# Patient Record
Sex: MALE | Race: WHITE | Employment: FULL TIME | URBAN - METROPOLITAN AREA
[De-identification: names, ages, dates, MRNs, and addresses within clinical notes are randomized per-mention and may not be internally consistent; named-entity substitution may affect disease eponyms.]

---

## 2020-10-07 ENCOUNTER — OFFICE VISIT (OUTPATIENT)
Dept: FAMILY MEDICINE CLINIC | Facility: CLINIC | Age: 60
End: 2020-10-07
Payer: COMMERCIAL

## 2020-10-07 VITALS
SYSTOLIC BLOOD PRESSURE: 121 MMHG | BODY MASS INDEX: 26.08 KG/M2 | HEART RATE: 106 BPM | WEIGHT: 166.19 LBS | HEIGHT: 67 IN | DIASTOLIC BLOOD PRESSURE: 68 MMHG | TEMPERATURE: 98 F

## 2020-10-07 DIAGNOSIS — K63.5 POLYP OF COLON, UNSPECIFIED PART OF COLON, UNSPECIFIED TYPE: ICD-10-CM

## 2020-10-07 DIAGNOSIS — Z00.00 ANNUAL PHYSICAL EXAM: Primary | ICD-10-CM

## 2020-10-07 PROCEDURE — 90471 IMMUNIZATION ADMIN: CPT | Performed by: FAMILY MEDICINE

## 2020-10-07 PROCEDURE — 3074F SYST BP LT 130 MM HG: CPT | Performed by: FAMILY MEDICINE

## 2020-10-07 PROCEDURE — 3078F DIAST BP <80 MM HG: CPT | Performed by: FAMILY MEDICINE

## 2020-10-07 PROCEDURE — 90750 HZV VACC RECOMBINANT IM: CPT | Performed by: FAMILY MEDICINE

## 2020-10-07 PROCEDURE — 99386 PREV VISIT NEW AGE 40-64: CPT | Performed by: FAMILY MEDICINE

## 2020-10-07 PROCEDURE — 3008F BODY MASS INDEX DOCD: CPT | Performed by: FAMILY MEDICINE

## 2020-10-07 NOTE — PROGRESS NOTES
HPI:    Patient ID: Mitali Schroeder is a [address] old male. Doing well. No complaints       Review of Systems   Constitutional: Negative. Negative for activity change, appetite change, diaphoresis, fatigue, fever and unexpected weight change.    HENT: Neg reflexes. Skin: Skin is warm and dry.               ASSESSMENT/PLAN:   Annual physical exam  (primary encounter diagnosis)  Polyp of colon, unspecified part of colon, unspecified type  Needs to see GI   Orders Placed This Encounter      Comp Metabolic Pan

## 2020-10-09 ENCOUNTER — APPOINTMENT (OUTPATIENT)
Dept: LAB | Age: 60
End: 2020-10-09
Attending: FAMILY MEDICINE
Payer: COMMERCIAL

## 2020-10-09 PROCEDURE — 85025 COMPLETE CBC W/AUTO DIFF WBC: CPT | Performed by: FAMILY MEDICINE

## 2020-10-09 PROCEDURE — 80061 LIPID PANEL: CPT | Performed by: FAMILY MEDICINE

## 2020-10-09 PROCEDURE — 36415 COLL VENOUS BLD VENIPUNCTURE: CPT | Performed by: FAMILY MEDICINE

## 2020-10-09 PROCEDURE — 80053 COMPREHEN METABOLIC PANEL: CPT | Performed by: FAMILY MEDICINE

## 2021-01-25 ENCOUNTER — TELEPHONE (OUTPATIENT)
Dept: GASTROENTEROLOGY | Facility: CLINIC | Age: 61
End: 2021-01-25

## 2021-01-25 ENCOUNTER — OFFICE VISIT (OUTPATIENT)
Dept: GASTROENTEROLOGY | Facility: CLINIC | Age: 61
End: 2021-01-25
Payer: COMMERCIAL

## 2021-01-25 VITALS
HEIGHT: 67 IN | WEIGHT: 169 LBS | BODY MASS INDEX: 26.53 KG/M2 | HEART RATE: 96 BPM | SYSTOLIC BLOOD PRESSURE: 140 MMHG | DIASTOLIC BLOOD PRESSURE: 86 MMHG

## 2021-01-25 DIAGNOSIS — Z12.11 SCREENING FOR COLORECTAL CANCER: ICD-10-CM

## 2021-01-25 DIAGNOSIS — Z12.11 COLON CANCER SCREENING: Primary | ICD-10-CM

## 2021-01-25 DIAGNOSIS — Z12.12 SCREENING FOR COLORECTAL CANCER: ICD-10-CM

## 2021-01-25 DIAGNOSIS — Z86.010 HX OF COLONIC POLYPS: ICD-10-CM

## 2021-01-25 DIAGNOSIS — Z86.010 HISTORY OF COLON POLYPS: Primary | ICD-10-CM

## 2021-01-25 PROCEDURE — S0285 CNSLT BEFORE SCREEN COLONOSC: HCPCS | Performed by: INTERNAL MEDICINE

## 2021-01-25 PROCEDURE — 3079F DIAST BP 80-89 MM HG: CPT | Performed by: INTERNAL MEDICINE

## 2021-01-25 PROCEDURE — 3077F SYST BP >= 140 MM HG: CPT | Performed by: INTERNAL MEDICINE

## 2021-01-25 PROCEDURE — 3008F BODY MASS INDEX DOCD: CPT | Performed by: INTERNAL MEDICINE

## 2021-01-25 NOTE — TELEPHONE ENCOUNTER
Scheduled for: Colonoscopy [address]07  Provider Name: Dr Maritza Santiago  Date: Fri 4/30/2021   Location: [facility]  Sedation: MAC   Time: 7:30 am, pt is aware that [facility] will call day before with arrival time  Prep: split dose Miralax/gatorade  Meds/Allergies Reconciled

## 2021-01-25 NOTE — PATIENT INSTRUCTIONS
Schedule colonoscopy for screening and a history of polyps following a split dose MiraLAX/Gatorade preparation and either IV sedation or monitored anesthesia care per scheduling.

## 2021-01-26 NOTE — PROGRESS NOTES
HPI:    Patient ID: Lindsey Drake is a 62year old male. HPI  The patient is seen today at the request of Dr. Calixto Cha for colorectal cancer screening in the setting of a history of adenomatous colon polyps.   The patient underwent a screening colonoscopy place, and time. He appears well-developed and well-nourished. No distress. HENT:   Mouth/Throat: Oropharynx is clear and moist. No oropharyngeal exudate. Eyes: Conjunctivae are normal. No scleral icterus. Neck: Neck supple. No thyromegaly present. 136 - 145 mmol/L 139   Potassium      3.5 - 5.1 mmol/L 4.3   Chloride      98 - 112 mmol/L 107   Carbon Dioxide, Total      21.0 - 32.0 mmol/L 27.0   ANION GAP      0 - 18 mmol/L 5   BUN      7 - 18 mg/dL 15   CREATININE      0.70 - 1.30 mg/dL 1.03   BUN/

## 2021-04-01 ENCOUNTER — TELEPHONE (OUTPATIENT)
Dept: FAMILY MEDICINE CLINIC | Facility: CLINIC | Age: 61
End: 2021-04-01

## 2021-04-01 NOTE — TELEPHONE ENCOUNTER
Patient is inquiring about how long should he wait in between the COVID vaccine to receive the 2nd shingles shot. Please call and advise.   [phone]

## 2021-04-01 NOTE — TELEPHONE ENCOUNTER
Pt informed CDC recommends to wait a minimum of 14 days between the Covid-19 vaccine and Shingrix vaccine. Pt states he knows he is due for his 2nd Shingrix vaccine and will need to schedule that appointment.  Pt is not already scheduled for a Covid-19 vacc

## 2021-04-06 ENCOUNTER — PATIENT MESSAGE (OUTPATIENT)
Dept: ADMINISTRATIVE | Age: 61
End: 2021-04-06

## 2021-04-13 ENCOUNTER — TELEPHONE (OUTPATIENT)
Dept: GASTROENTEROLOGY | Facility: CLINIC | Age: 61
End: 2021-04-13

## 2021-04-13 NOTE — TELEPHONE ENCOUNTER
Pt. states that he is sched to have 2nd Corona Vaccine on same date as the [address] on 4/30/2021, so he wants to know if it is still ok to have proc done? . Pt. Also states that he is not sure of arrival time for the [facility].

## 2021-04-13 NOTE — TELEPHONE ENCOUNTER
There is no absolute contraindication to having the vaccine on the same day as the colonoscopy, however, if the patient develops fever and chills it could be difficult to distinguish the symptoms as side effects related to the vaccine or a complication rel

## 2021-04-13 NOTE — TELEPHONE ENCOUNTER
Dr. Garima Meza-     Please advise on whether or not patient would be okay to have colonoscopy and 2nd COVID 19 vaccine within the same day. He is scheduled for 4/30/2021 for both the procedure and 2nd vaccine.

## 2021-04-14 NOTE — TELEPHONE ENCOUNTER
I spoke to Lee mcdaniel. He is still contemplating whether or not he will need to reschedule. Patient is scheduled at [facility], and therefore has no confirmed time slot for procedure.  He is having his second vaccine at 3 PM, but since there is no definitive time fo

## 2021-04-14 NOTE — TELEPHONE ENCOUNTER
Dr. Eric Johnston! I spoke with the pt & advised him that the next available date & time at [facility] (pt has [facility] insurance) would be on 7/16/21. Pt does not wish to wait that long. He will keep his current procedure on 4/30/21.     I spoke with

## 2021-04-14 NOTE — TELEPHONE ENCOUNTER
Noted & appreciated. I will notify the patient of this information. LMTCB. I also relayed info via [address]

## 2021-04-20 ENCOUNTER — TELEPHONE (OUTPATIENT)
Dept: GASTROENTEROLOGY | Facility: CLINIC | Age: 61
End: 2021-04-20

## 2021-04-20 NOTE — TELEPHONE ENCOUNTER
Endo RN's place this order. This means that they have not yet gotten ahold of the patient. Likely will reach out soon.

## 2021-04-20 NOTE — TELEPHONE ENCOUNTER
Pt called in stating there is no order in yet for the covid-19 test prior to the procedure. Please put order in and please call pt when order is put in.  Thank you

## 2021-04-27 ENCOUNTER — LAB REQUISITION (OUTPATIENT)
Dept: LAB | Facility: HOSPITAL | Age: 61
End: 2021-04-27
Payer: COMMERCIAL

## 2021-04-27 DIAGNOSIS — Z01.818 ENCOUNTER FOR OTHER PREPROCEDURAL EXAMINATION: ICD-10-CM

## 2021-04-30 ENCOUNTER — SURGERY CENTER DOCUMENTATION (OUTPATIENT)
Dept: SURGERY | Age: 61
End: 2021-04-30

## 2021-04-30 PROCEDURE — 45385 COLONOSCOPY W/LESION REMOVAL: CPT | Performed by: INTERNAL MEDICINE

## 2021-04-30 NOTE — PROCEDURES
[address] Endoscopy Report      Date of Procedure:  04/30/21      Preoperative Diagnosis:  1. Colorectal cancer screening  2.   Personal history of adenomatous colon polyps      Postoperative Diagnosis:  Colon polyps      Procedur examination), mass lesions, vascular anomalies or signs of inflammation seen. Retroflexion in the rectum revealed no abnormalities. The procedure was well tolerated without immediate complication. Impression:  1. Diminutive/small colon polyps  2.

## 2021-05-06 ENCOUNTER — TELEPHONE (OUTPATIENT)
Dept: GASTROENTEROLOGY | Facility: CLINIC | Age: 61
End: 2021-05-06

## 2021-05-06 NOTE — TELEPHONE ENCOUNTER
Received pathology report from patient's 4/30/21 colonoscopy. Will place on Dr. Maritza Santiago' desk for review.

## 2021-05-23 ENCOUNTER — PATIENT MESSAGE (OUTPATIENT)
Dept: GASTROENTEROLOGY | Facility: CLINIC | Age: 61
End: 2021-05-23

## 2021-05-24 ENCOUNTER — TELEPHONE (OUTPATIENT)
Dept: GASTROENTEROLOGY | Facility: CLINIC | Age: 61
End: 2021-05-24

## 2021-05-24 NOTE — TELEPHONE ENCOUNTER
From: Jesus Barlow  To: Marissa Lopez MD  Sent: 5/23/2021 10:22 AM CDT  Subject: Visit Follow-up Question    Hello,    We spoke briefly after my colonoscopy.  I was told that my next visit would be in the next 7-10 years dependent on the results of

## 2021-05-24 NOTE — TELEPHONE ENCOUNTER
Dr. Unique Alcantar-    Please see patient's message below, inquiring colon recall advisement. Patient's pathology report placed below.  (copy also placed on your desk, as this is hard to read.)

## 2021-05-25 NOTE — TELEPHONE ENCOUNTER
Recall colon in 5 years per Dr. Kunal Jacques. Last done:4/30/21  Next due:4/30/26      Updated health maintenance and pt outreach.

## 2021-11-13 ENCOUNTER — LAB ENCOUNTER (OUTPATIENT)
Dept: LAB | Facility: HOSPITAL | Age: 61
End: 2021-11-13
Attending: FAMILY MEDICINE
Payer: COMMERCIAL

## 2021-11-13 ENCOUNTER — OFFICE VISIT (OUTPATIENT)
Dept: FAMILY MEDICINE CLINIC | Facility: CLINIC | Age: 61
End: 2021-11-13
Payer: COMMERCIAL

## 2021-11-13 VITALS
DIASTOLIC BLOOD PRESSURE: 82 MMHG | BODY MASS INDEX: 23.23 KG/M2 | HEART RATE: 74 BPM | WEIGHT: 148 LBS | SYSTOLIC BLOOD PRESSURE: 128 MMHG | HEIGHT: 67 IN

## 2021-11-13 DIAGNOSIS — Z00.00 ANNUAL PHYSICAL EXAM: Primary | ICD-10-CM

## 2021-11-13 DIAGNOSIS — Z00.00 ANNUAL PHYSICAL EXAM: ICD-10-CM

## 2021-11-13 PROCEDURE — 80053 COMPREHEN METABOLIC PANEL: CPT | Performed by: FAMILY MEDICINE

## 2021-11-13 PROCEDURE — 36415 COLL VENOUS BLD VENIPUNCTURE: CPT | Performed by: FAMILY MEDICINE

## 2021-11-13 PROCEDURE — 84443 ASSAY THYROID STIM HORMONE: CPT | Performed by: FAMILY MEDICINE

## 2021-11-13 PROCEDURE — 3074F SYST BP LT 130 MM HG: CPT | Performed by: FAMILY MEDICINE

## 2021-11-13 PROCEDURE — 3079F DIAST BP 80-89 MM HG: CPT | Performed by: FAMILY MEDICINE

## 2021-11-13 PROCEDURE — 99396 PREV VISIT EST AGE 40-64: CPT | Performed by: FAMILY MEDICINE

## 2021-11-13 PROCEDURE — 85025 COMPLETE CBC W/AUTO DIFF WBC: CPT | Performed by: FAMILY MEDICINE

## 2021-11-13 PROCEDURE — 80061 LIPID PANEL: CPT | Performed by: FAMILY MEDICINE

## 2021-11-13 PROCEDURE — 3008F BODY MASS INDEX DOCD: CPT | Performed by: FAMILY MEDICINE

## 2021-11-13 NOTE — TELEPHONE ENCOUNTER
LM- Dr Ronnie Sheldon told patient to go to [facility], his insurance states [facility]

## 2021-11-13 NOTE — PROGRESS NOTES
Subjective:   Patient ID: Bhaskar Olsen is a 62year old male. Here for annual physical. No complaints. History/Other:   Review of Systems   Constitutional: Negative. Negative for activity change, appetite change, diaphoresis and fatigue.      Re

## 2022-10-28 ENCOUNTER — LAB ENCOUNTER (OUTPATIENT)
Dept: LAB | Age: 62
End: 2022-10-28
Attending: FAMILY MEDICINE
Payer: COMMERCIAL

## 2022-10-28 ENCOUNTER — OFFICE VISIT (OUTPATIENT)
Dept: FAMILY MEDICINE CLINIC | Facility: CLINIC | Age: 62
End: 2022-10-28
Payer: COMMERCIAL

## 2022-10-28 VITALS
BODY MASS INDEX: 24.8 KG/M2 | DIASTOLIC BLOOD PRESSURE: 86 MMHG | WEIGHT: 158 LBS | HEIGHT: 67 IN | SYSTOLIC BLOOD PRESSURE: 129 MMHG | HEART RATE: 87 BPM | TEMPERATURE: 98 F

## 2022-10-28 DIAGNOSIS — Z00.00 ANNUAL PHYSICAL EXAM: ICD-10-CM

## 2022-10-28 DIAGNOSIS — Z00.00 ANNUAL PHYSICAL EXAM: Primary | ICD-10-CM

## 2022-10-28 LAB
ALBUMIN SERPL-MCNC: 4.2 G/DL (ref 3.4–5)
ALBUMIN/GLOB SERPL: 1.5 {RATIO} (ref 1–2)
ALP LIVER SERPL-CCNC: 51 U/L
ALT SERPL-CCNC: 27 U/L
ANION GAP SERPL CALC-SCNC: 6 MMOL/L (ref 0–18)
AST SERPL-CCNC: 20 U/L (ref 15–37)
BASOPHILS # BLD AUTO: 0.06 X10(3) UL (ref 0–0.2)
BASOPHILS NFR BLD AUTO: 1.5 %
BILIRUB SERPL-MCNC: 1 MG/DL (ref 0.1–2)
BUN BLD-MCNC: 15 MG/DL (ref 7–18)
BUN/CREAT SERPL: 14 (ref 10–20)
CALCIUM BLD-MCNC: 8.8 MG/DL (ref 8.5–10.1)
CHLORIDE SERPL-SCNC: 107 MMOL/L (ref 98–112)
CHOLEST SERPL-MCNC: 117 MG/DL (ref ?–200)
CO2 SERPL-SCNC: 26 MMOL/L (ref 21–32)
COMPLEXED PSA SERPL-MCNC: 0.32 NG/ML (ref ?–4)
CREAT BLD-MCNC: 1.07 MG/DL
DEPRECATED RDW RBC AUTO: 42.3 FL (ref 35.1–46.3)
EOSINOPHIL # BLD AUTO: 0.16 X10(3) UL (ref 0–0.7)
EOSINOPHIL NFR BLD AUTO: 3.9 %
ERYTHROCYTE [DISTWIDTH] IN BLOOD BY AUTOMATED COUNT: 12.4 % (ref 11–15)
FASTING PATIENT LIPID ANSWER: YES
FASTING STATUS PATIENT QL REPORTED: YES
GFR SERPLBLD BASED ON 1.73 SQ M-ARVRAT: 79 ML/MIN/1.73M2 (ref 60–?)
GLOBULIN PLAS-MCNC: 2.8 G/DL (ref 2.8–4.4)
GLUCOSE BLD-MCNC: 87 MG/DL (ref 70–99)
HCT VFR BLD AUTO: 42.3 %
HDLC SERPL-MCNC: 50 MG/DL (ref 40–59)
HGB BLD-MCNC: 13.6 G/DL
IMM GRANULOCYTES # BLD AUTO: 0.01 X10(3) UL (ref 0–1)
IMM GRANULOCYTES NFR BLD: 0.2 %
LDLC SERPL CALC-MCNC: 55 MG/DL (ref ?–100)
LYMPHOCYTES # BLD AUTO: 1 X10(3) UL (ref 1–4)
LYMPHOCYTES NFR BLD AUTO: 24.3 %
MCH RBC QN AUTO: 29.6 PG (ref 26–34)
MCHC RBC AUTO-ENTMCNC: 32.2 G/DL (ref 31–37)
MCV RBC AUTO: 92 FL
MONOCYTES # BLD AUTO: 0.35 X10(3) UL (ref 0.1–1)
MONOCYTES NFR BLD AUTO: 8.5 %
NEUTROPHILS # BLD AUTO: 2.53 X10 (3) UL (ref 1.5–7.7)
NEUTROPHILS # BLD AUTO: 2.53 X10(3) UL (ref 1.5–7.7)
NEUTROPHILS NFR BLD AUTO: 61.6 %
NONHDLC SERPL-MCNC: 67 MG/DL (ref ?–130)
OSMOLALITY SERPL CALC.SUM OF ELEC: 288 MOSM/KG (ref 275–295)
PLATELET # BLD AUTO: 196 10(3)UL (ref 150–450)
POTASSIUM SERPL-SCNC: 3.9 MMOL/L (ref 3.5–5.1)
PROT SERPL-MCNC: 7 G/DL (ref 6.4–8.2)
RBC # BLD AUTO: 4.6 X10(6)UL
SODIUM SERPL-SCNC: 139 MMOL/L (ref 136–145)
TRIGL SERPL-MCNC: 54 MG/DL (ref 30–149)
VLDLC SERPL CALC-MCNC: 8 MG/DL (ref 0–30)
WBC # BLD AUTO: 4.1 X10(3) UL (ref 4–11)

## 2022-10-28 PROCEDURE — 80061 LIPID PANEL: CPT | Performed by: FAMILY MEDICINE

## 2022-10-28 PROCEDURE — 3074F SYST BP LT 130 MM HG: CPT | Performed by: FAMILY MEDICINE

## 2022-10-28 PROCEDURE — 99396 PREV VISIT EST AGE 40-64: CPT | Performed by: FAMILY MEDICINE

## 2022-10-28 PROCEDURE — 36415 COLL VENOUS BLD VENIPUNCTURE: CPT | Performed by: FAMILY MEDICINE

## 2022-10-28 PROCEDURE — 3079F DIAST BP 80-89 MM HG: CPT | Performed by: FAMILY MEDICINE

## 2022-10-28 PROCEDURE — 80053 COMPREHEN METABOLIC PANEL: CPT | Performed by: FAMILY MEDICINE

## 2022-10-28 PROCEDURE — 85025 COMPLETE CBC W/AUTO DIFF WBC: CPT | Performed by: FAMILY MEDICINE

## 2022-10-28 PROCEDURE — 3008F BODY MASS INDEX DOCD: CPT | Performed by: FAMILY MEDICINE

## 2023-11-17 ENCOUNTER — LAB ENCOUNTER (OUTPATIENT)
Dept: LAB | Age: 63
End: 2023-11-17
Attending: FAMILY MEDICINE
Payer: COMMERCIAL

## 2023-11-17 ENCOUNTER — OFFICE VISIT (OUTPATIENT)
Dept: FAMILY MEDICINE CLINIC | Facility: CLINIC | Age: 63
End: 2023-11-17
Payer: COMMERCIAL

## 2023-11-17 VITALS
OXYGEN SATURATION: 99 % | BODY MASS INDEX: 24.01 KG/M2 | SYSTOLIC BLOOD PRESSURE: 117 MMHG | HEIGHT: 67 IN | HEART RATE: 82 BPM | RESPIRATION RATE: 20 BRPM | WEIGHT: 153 LBS | DIASTOLIC BLOOD PRESSURE: 67 MMHG

## 2023-11-17 DIAGNOSIS — Z00.00 ANNUAL PHYSICAL EXAM: Primary | ICD-10-CM

## 2023-11-17 LAB
ALBUMIN SERPL-MCNC: 4.3 G/DL (ref 3.2–4.8)
ALBUMIN/GLOB SERPL: 1.5 {RATIO} (ref 1–2)
ALP LIVER SERPL-CCNC: 57 U/L
ALT SERPL-CCNC: 17 U/L
ANION GAP SERPL CALC-SCNC: 6 MMOL/L (ref 0–18)
AST SERPL-CCNC: 23 U/L (ref ?–34)
BASOPHILS # BLD AUTO: 0.06 X10(3) UL (ref 0–0.2)
BASOPHILS NFR BLD AUTO: 1.1 %
BILIRUB SERPL-MCNC: 1.3 MG/DL (ref 0.2–1.1)
BUN BLD-MCNC: 15 MG/DL (ref 9–23)
BUN/CREAT SERPL: 13.8 (ref 10–20)
CALCIUM BLD-MCNC: 9 MG/DL (ref 8.7–10.4)
CHLORIDE SERPL-SCNC: 107 MMOL/L (ref 98–112)
CHOLEST SERPL-MCNC: 127 MG/DL (ref ?–200)
CO2 SERPL-SCNC: 26 MMOL/L (ref 21–32)
CREAT BLD-MCNC: 1.09 MG/DL
DEPRECATED RDW RBC AUTO: 41.7 FL (ref 35.1–46.3)
EGFRCR SERPLBLD CKD-EPI 2021: 77 ML/MIN/1.73M2 (ref 60–?)
EOSINOPHIL # BLD AUTO: 0.11 X10(3) UL (ref 0–0.7)
EOSINOPHIL NFR BLD AUTO: 2 %
ERYTHROCYTE [DISTWIDTH] IN BLOOD BY AUTOMATED COUNT: 12.6 % (ref 11–15)
FASTING PATIENT LIPID ANSWER: YES
FASTING STATUS PATIENT QL REPORTED: YES
GLOBULIN PLAS-MCNC: 2.8 G/DL (ref 2.8–4.4)
GLUCOSE BLD-MCNC: 87 MG/DL (ref 70–99)
HCT VFR BLD AUTO: 41.7 %
HDLC SERPL-MCNC: 46 MG/DL (ref 40–59)
HGB BLD-MCNC: 13.4 G/DL
IMM GRANULOCYTES # BLD AUTO: 0.01 X10(3) UL (ref 0–1)
IMM GRANULOCYTES NFR BLD: 0.2 %
LDLC SERPL CALC-MCNC: 68 MG/DL (ref ?–100)
LYMPHOCYTES # BLD AUTO: 1.03 X10(3) UL (ref 1–4)
LYMPHOCYTES NFR BLD AUTO: 19 %
MCH RBC QN AUTO: 28.9 PG (ref 26–34)
MCHC RBC AUTO-ENTMCNC: 32.1 G/DL (ref 31–37)
MCV RBC AUTO: 90.1 FL
MONOCYTES # BLD AUTO: 0.42 X10(3) UL (ref 0.1–1)
MONOCYTES NFR BLD AUTO: 7.7 %
NEUTROPHILS # BLD AUTO: 3.8 X10 (3) UL (ref 1.5–7.7)
NEUTROPHILS # BLD AUTO: 3.8 X10(3) UL (ref 1.5–7.7)
NEUTROPHILS NFR BLD AUTO: 70 %
NONHDLC SERPL-MCNC: 81 MG/DL (ref ?–130)
OSMOLALITY SERPL CALC.SUM OF ELEC: 288 MOSM/KG (ref 275–295)
PLATELET # BLD AUTO: 216 10(3)UL (ref 150–450)
POTASSIUM SERPL-SCNC: 4.4 MMOL/L (ref 3.5–5.1)
PROT SERPL-MCNC: 7.1 G/DL (ref 5.7–8.2)
RBC # BLD AUTO: 4.63 X10(6)UL
SODIUM SERPL-SCNC: 139 MMOL/L (ref 136–145)
TRIGL SERPL-MCNC: 63 MG/DL (ref 30–149)
VLDLC SERPL CALC-MCNC: 9 MG/DL (ref 0–30)
WBC # BLD AUTO: 5.4 X10(3) UL (ref 4–11)

## 2023-11-17 PROCEDURE — 99396 PREV VISIT EST AGE 40-64: CPT | Performed by: FAMILY MEDICINE

## 2023-11-17 PROCEDURE — 3078F DIAST BP <80 MM HG: CPT | Performed by: FAMILY MEDICINE

## 2023-11-17 PROCEDURE — 3008F BODY MASS INDEX DOCD: CPT | Performed by: FAMILY MEDICINE

## 2023-11-17 PROCEDURE — 80061 LIPID PANEL: CPT | Performed by: FAMILY MEDICINE

## 2023-11-17 PROCEDURE — 80053 COMPREHEN METABOLIC PANEL: CPT | Performed by: FAMILY MEDICINE

## 2023-11-17 PROCEDURE — 85025 COMPLETE CBC W/AUTO DIFF WBC: CPT | Performed by: FAMILY MEDICINE

## 2023-11-17 PROCEDURE — 36415 COLL VENOUS BLD VENIPUNCTURE: CPT | Performed by: FAMILY MEDICINE

## 2023-11-17 PROCEDURE — 3074F SYST BP LT 130 MM HG: CPT | Performed by: FAMILY MEDICINE

## 2023-11-17 RX ORDER — FEXOFENADINE HCL AND PSEUDOEPHEDRINE HCI 60; 120 MG/1; MG/1
1 TABLET, EXTENDED RELEASE ORAL 2 TIMES DAILY
Qty: 90 TABLET | Refills: 1 | Status: SHIPPED | OUTPATIENT
Start: 2023-11-17

## 2023-11-17 RX ORDER — FLUTICASONE PROPIONATE 50 MCG
2 SPRAY, SUSPENSION (ML) NASAL DAILY
Qty: 3 EACH | Refills: 1 | Status: SHIPPED | OUTPATIENT
Start: 2023-11-17 | End: 2024-11-11

## 2023-11-17 NOTE — PROGRESS NOTES
Subjective:   Patient ID: Gita Hollingsworth is a 59year old male. HPI  Doing well no complaints    Complaints   History/Other:   Review of Systems    Constitutional: Negative. Negative for activity change, appetite change, diaphoresis and fatigue. Respiratory: Negative. Negative for apnea, cough, chest tightness and shortness of breath. Cardiovascular: Negative. Negative for chest pain, palpitations and leg swelling. Gastrointestinal: Negative. Negative for abdominal pain. Skin: Negative. Psychiatric/Behavioral: Negative. Current Outpatient Medications   Medication Sig Dispense Refill    fexofenadine-pseudoephedrine ER (ALLEGRA-D ALLERGY & CONGESTION)  MG Oral Tablet 12 Hr Take 1 tablet by mouth 2 (two) times daily. 90 tablet 1    fluticasone propionate 50 MCG/ACT Nasal Suspension 2 sprays by Each Nare route daily. 3 each 1    sertraline 50 MG Oral Tab Take 1 tablet (50 mg total) by mouth nightly. Allergies: Allergies   Allergen Reactions    Cephalexin      Other reaction(s): Hives    Penicillins      Other reaction(s): Hives    Tetracycline      Other reaction(s): Hives       Objective:   Physical Exam  Constitutional:       Appearance: He is well-developed. Cardiovascular:      Rate and Rhythm: Normal rate and regular rhythm. Heart sounds: Normal heart sounds. Pulmonary:      Effort: Pulmonary effort is normal.      Breath sounds: Normal breath sounds. Abdominal:      General: Bowel sounds are normal.      Palpations: Abdomen is soft. Neurological:      Mental Status: He is alert. Deep Tendon Reflexes: Reflexes are normal and symmetric. Assessment & Plan:   1.  Annual physical exam    Doing well diet and exercise discussed    Orders Placed This Encounter   Procedures    Comp Metabolic Panel (14)    Lipid Panel    CBC With Differential With Platelet       Meds This Visit:  Requested Prescriptions     Signed Prescriptions Disp Refills fexofenadine-pseudoephedrine ER (ALLEGRA-D ALLERGY & CONGESTION)  MG Oral Tablet 12 Hr 90 tablet 1     Sig: Take 1 tablet by mouth 2 (two) times daily. fluticasone propionate 50 MCG/ACT Nasal Suspension 3 each 1     Si sprays by Each Nare route daily.        Imaging & Referrals:  None

## 2025-01-10 ENCOUNTER — LAB ENCOUNTER (OUTPATIENT)
Dept: LAB | Age: 65
End: 2025-01-10
Attending: FAMILY MEDICINE
Payer: COMMERCIAL

## 2025-01-10 ENCOUNTER — OFFICE VISIT (OUTPATIENT)
Dept: FAMILY MEDICINE CLINIC | Facility: CLINIC | Age: 65
End: 2025-01-10
Payer: COMMERCIAL

## 2025-01-10 VITALS
HEART RATE: 73 BPM | OXYGEN SATURATION: 99 % | SYSTOLIC BLOOD PRESSURE: 117 MMHG | DIASTOLIC BLOOD PRESSURE: 69 MMHG | TEMPERATURE: 97 F | WEIGHT: 159 LBS | RESPIRATION RATE: 20 BRPM | BODY MASS INDEX: 24.96 KG/M2 | HEIGHT: 67 IN

## 2025-01-10 DIAGNOSIS — H91.90 HEARING DISORDER, UNSPECIFIED LATERALITY: ICD-10-CM

## 2025-01-10 DIAGNOSIS — T78.40XA ALLERGY, INITIAL ENCOUNTER: ICD-10-CM

## 2025-01-10 DIAGNOSIS — Z00.00 ANNUAL PHYSICAL EXAM: Primary | ICD-10-CM

## 2025-01-10 DIAGNOSIS — Z00.00 ANNUAL PHYSICAL EXAM: ICD-10-CM

## 2025-01-10 LAB
ALBUMIN SERPL-MCNC: 4.6 G/DL (ref 3.2–4.8)
ALBUMIN/GLOB SERPL: 2.1 {RATIO} (ref 1–2)
ALP LIVER SERPL-CCNC: 57 U/L
ALT SERPL-CCNC: 27 U/L
ANION GAP SERPL CALC-SCNC: 9 MMOL/L (ref 0–18)
AST SERPL-CCNC: 28 U/L (ref ?–34)
BASOPHILS # BLD AUTO: 0.06 X10(3) UL (ref 0–0.2)
BASOPHILS NFR BLD AUTO: 1.3 %
BILIRUB SERPL-MCNC: 1.2 MG/DL (ref 0.2–1.1)
BUN BLD-MCNC: 13 MG/DL (ref 9–23)
BUN/CREAT SERPL: 12.1 (ref 10–20)
CALCIUM BLD-MCNC: 9.6 MG/DL (ref 8.7–10.4)
CHLORIDE SERPL-SCNC: 106 MMOL/L (ref 98–112)
CHOLEST SERPL-MCNC: 142 MG/DL (ref ?–200)
CO2 SERPL-SCNC: 25 MMOL/L (ref 21–32)
COMPLEXED PSA SERPL-MCNC: 0.36 NG/ML (ref ?–4)
CREAT BLD-MCNC: 1.07 MG/DL
DEPRECATED RDW RBC AUTO: 41.1 FL (ref 35.1–46.3)
EGFRCR SERPLBLD CKD-EPI 2021: 78 ML/MIN/1.73M2 (ref 60–?)
EOSINOPHIL # BLD AUTO: 0.12 X10(3) UL (ref 0–0.7)
EOSINOPHIL NFR BLD AUTO: 2.6 %
ERYTHROCYTE [DISTWIDTH] IN BLOOD BY AUTOMATED COUNT: 12.4 % (ref 11–15)
FASTING PATIENT LIPID ANSWER: YES
FASTING STATUS PATIENT QL REPORTED: YES
GLOBULIN PLAS-MCNC: 2.2 G/DL (ref 2–3.5)
GLUCOSE BLD-MCNC: 89 MG/DL (ref 70–99)
HCT VFR BLD AUTO: 43.1 %
HDLC SERPL-MCNC: 51 MG/DL (ref 40–59)
HGB BLD-MCNC: 14 G/DL
IMM GRANULOCYTES # BLD AUTO: 0.01 X10(3) UL (ref 0–1)
IMM GRANULOCYTES NFR BLD: 0.2 %
LDLC SERPL CALC-MCNC: 79 MG/DL (ref ?–100)
LYMPHOCYTES # BLD AUTO: 1.07 X10(3) UL (ref 1–4)
LYMPHOCYTES NFR BLD AUTO: 23.1 %
MCH RBC QN AUTO: 29.4 PG (ref 26–34)
MCHC RBC AUTO-ENTMCNC: 32.5 G/DL (ref 31–37)
MCV RBC AUTO: 90.4 FL
MONOCYTES # BLD AUTO: 0.38 X10(3) UL (ref 0.1–1)
MONOCYTES NFR BLD AUTO: 8.2 %
NEUTROPHILS # BLD AUTO: 2.99 X10 (3) UL (ref 1.5–7.7)
NEUTROPHILS # BLD AUTO: 2.99 X10(3) UL (ref 1.5–7.7)
NEUTROPHILS NFR BLD AUTO: 64.6 %
NONHDLC SERPL-MCNC: 91 MG/DL (ref ?–130)
OSMOLALITY SERPL CALC.SUM OF ELEC: 290 MOSM/KG (ref 275–295)
PLATELET # BLD AUTO: 200 10(3)UL (ref 150–450)
POTASSIUM SERPL-SCNC: 4.6 MMOL/L (ref 3.5–5.1)
PROT SERPL-MCNC: 6.8 G/DL (ref 5.7–8.2)
RBC # BLD AUTO: 4.77 X10(6)UL
SODIUM SERPL-SCNC: 140 MMOL/L (ref 136–145)
TRIGL SERPL-MCNC: 58 MG/DL (ref 30–149)
VIT D+METAB SERPL-MCNC: 35.1 NG/ML (ref 30–100)
VLDLC SERPL CALC-MCNC: 9 MG/DL (ref 0–30)
WBC # BLD AUTO: 4.6 X10(3) UL (ref 4–11)

## 2025-01-10 PROCEDURE — 80053 COMPREHEN METABOLIC PANEL: CPT | Performed by: FAMILY MEDICINE

## 2025-01-10 PROCEDURE — 85025 COMPLETE CBC W/AUTO DIFF WBC: CPT | Performed by: FAMILY MEDICINE

## 2025-01-10 PROCEDURE — 36415 COLL VENOUS BLD VENIPUNCTURE: CPT

## 2025-01-10 PROCEDURE — 82306 VITAMIN D 25 HYDROXY: CPT

## 2025-01-10 PROCEDURE — 80061 LIPID PANEL: CPT | Performed by: FAMILY MEDICINE

## 2025-01-10 NOTE — PROGRESS NOTES
Subjective:   Patient ID: Jem Andres is a 64 year old male.    HPI  Here for annual physical   Having also hearing issues and allergies   Otherwise well   Exercising daily   Has allergy problems  History/Other:   Review of Systems    Constitutional: Negative.  Negative for activity change, appetite change, diaphoresis and fatigue.   HEENT nasal congestion from allergies   Respiratory: Negative.  Negative for apnea, cough, chest tightness and shortness of breath.    Cardiovascular: Negative.  Negative for chest pain, palpitations and leg swelling.   Gastrointestinal: Negative.  Negative for abdominal pain.   Skin: Negative.           Psychiatric/Behavioral: Negative.        Current Outpatient Medications   Medication Sig Dispense Refill    fexofenadine-pseudoephedrine ER (ALLEGRA-D ALLERGY & CONGESTION)  MG Oral Tablet 12 Hr Take 1 tablet by mouth 2 (two) times daily. 90 tablet 1    sertraline 50 MG Oral Tab Take 1 tablet (50 mg total) by mouth nightly.       Allergies:Allergies[1]    Objective:   Physical Exam  Constitutional:       Appearance: He is well-developed.   Cardiovascular:      Rate and Rhythm: Normal rate and regular rhythm.      Heart sounds: Normal heart sounds.   Pulmonary:      Effort: Pulmonary effort is normal.      Breath sounds: Normal breath sounds.   Abdominal:      General: Bowel sounds are normal.      Palpations: Abdomen is soft.   Skin:     General: Skin is warm and dry.   Neurological:      Mental Status: He is alert and oriented to person, place, and time.      Deep Tendon Reflexes: Reflexes are normal and symmetric.         Assessment & Plan:   1. Annual physical exam    2. Allergy, initial encounter    3. Hearing disorder, unspecified laterality    Needs allergy eval and hearing test       Orders Placed This Encounter   Procedures    Comp Metabolic Panel (14)    Lipid Panel    CBC With Differential With Platelet    Vitamin D [E]    PSA (Screening) [E]    Prevnar 20 (PCV20)  [97999]       Meds This Visit:  Requested Prescriptions      No prescriptions requested or ordered in this encounter       Imaging & Referrals:  PCV20 VACCINE FOR INTRAMUSCULAR USE  ALLERGY - INTERNAL  ENT - INTERNAL         [1]   Allergies  Allergen Reactions    Cephalexin      Other reaction(s): Hives    Penicillins      Other reaction(s): Hives    Tetracycline      Other reaction(s): Hives

## 2025-02-20 ENCOUNTER — PATIENT MESSAGE (OUTPATIENT)
Dept: CASE MANAGEMENT | Age: 65
End: 2025-02-20

## 2025-02-21 ENCOUNTER — PATIENT MESSAGE (OUTPATIENT)
Dept: FAMILY MEDICINE CLINIC | Facility: CLINIC | Age: 65
End: 2025-02-21

## 2025-02-21 DIAGNOSIS — Z00.00 ANNUAL PHYSICAL EXAM: Primary | ICD-10-CM

## 2025-02-26 ENCOUNTER — LAB ENCOUNTER (OUTPATIENT)
Dept: LAB | Age: 65
End: 2025-02-26
Attending: FAMILY MEDICINE
Payer: COMMERCIAL

## 2025-02-26 LAB
RUBV IGG SER QL: POSITIVE
RUBV IGG SER-ACNC: 106 IU/ML (ref 10–?)

## 2025-02-26 PROCEDURE — 36415 COLL VENOUS BLD VENIPUNCTURE: CPT | Performed by: FAMILY MEDICINE

## 2025-02-26 PROCEDURE — 86765 RUBEOLA ANTIBODY: CPT | Performed by: FAMILY MEDICINE

## 2025-02-26 PROCEDURE — 86735 MUMPS ANTIBODY: CPT | Performed by: FAMILY MEDICINE

## 2025-02-26 PROCEDURE — 86762 RUBELLA ANTIBODY: CPT | Performed by: FAMILY MEDICINE

## 2025-02-28 LAB
MEV IGG SER-ACNC: 56.8 AU/ML (ref 16.5–?)
MUV IGG SER IA-ACNC: <5 AU/ML (ref 11–?)

## 2025-03-03 ENCOUNTER — OFFICE VISIT (OUTPATIENT)
Dept: ALLERGY | Facility: CLINIC | Age: 65
End: 2025-03-03
Payer: COMMERCIAL

## 2025-03-03 ENCOUNTER — NURSE ONLY (OUTPATIENT)
Dept: FAMILY MEDICINE CLINIC | Facility: CLINIC | Age: 65
End: 2025-03-03

## 2025-03-03 DIAGNOSIS — Z23 NEED FOR COVID-19 VACCINE: ICD-10-CM

## 2025-03-03 DIAGNOSIS — Z23 IMMUNIZATION DUE: Primary | ICD-10-CM

## 2025-03-03 DIAGNOSIS — J30.89 SEASONAL AND PERENNIAL ALLERGIC RHINOCONJUNCTIVITIS: Primary | ICD-10-CM

## 2025-03-03 DIAGNOSIS — Z23 FLU VACCINE NEED: ICD-10-CM

## 2025-03-03 DIAGNOSIS — J30.2 SEASONAL AND PERENNIAL ALLERGIC RHINOCONJUNCTIVITIS: Primary | ICD-10-CM

## 2025-03-03 DIAGNOSIS — H10.10 SEASONAL AND PERENNIAL ALLERGIC RHINOCONJUNCTIVITIS: Primary | ICD-10-CM

## 2025-03-03 DIAGNOSIS — J98.01 BRONCHOSPASM: ICD-10-CM

## 2025-03-03 PROCEDURE — 90707 MMR VACCINE SC: CPT | Performed by: FAMILY MEDICINE

## 2025-03-03 PROCEDURE — 90471 IMMUNIZATION ADMIN: CPT | Performed by: FAMILY MEDICINE

## 2025-03-03 PROCEDURE — 99204 OFFICE O/P NEW MOD 45 MIN: CPT | Performed by: ALLERGY & IMMUNOLOGY

## 2025-03-03 RX ORDER — DIPHENHYDRAMINE HCL 50 MG
50 CAPSULE ORAL EVERY 6 HOURS PRN
COMMUNITY

## 2025-03-03 RX ORDER — AZELASTINE 1 MG/ML
2 SPRAY, METERED NASAL 2 TIMES DAILY
Qty: 3 EACH | Refills: 0 | Status: SHIPPED | OUTPATIENT
Start: 2025-03-03

## 2025-03-03 NOTE — PATIENT INSTRUCTIONS
Assessment & Plan  Allergic Rhinitis  Chronic allergic rhinitis with perennial symptoms, exacerbated in spring and fall. Symptoms include watery eyes, rhinorrhea, dysphonia, and postnasal drip. Previously underwent two courses of immunotherapy, last over a decade ago. Currently on Allegra D and Benadryl PRN. Interested in resuming immunotherapy pending confirmation of allergies. Discussed Astelin as an alternative to Allegra D to minimize systemic side effects such as hypertension, insomnia, and tachycardia.  - Prescribe Astelin nasal spray, two sprays per nostril up to twice daily PRN.  - Provide educational materials on immunotherapy.  -Unable to skin test today as patient took Benadryl last night and did not respond to the histamine control.  Skin testing at next visit once patient is off antihistamines for least 5 days.      Bronchospasm  No asthma history. Previous spirometry over ten years ago reportedly low, but no documentation available. Denies recurrent cough, wheezing, chest tightness, or dyspnea. Declines spirometry currently.  - Monitor for asthma-like symptoms.  - Instruct to report any future asthma-like symptoms.    Vaccination Status  Influenza vaccine current for . COVID booster recommended as last booster was 2024. Pneumococcal vaccination with Prevnar 20 advised.  - Recommend COVID booster; verify availability at local pharmacy.  - Recommend pneumococcal vaccine with Prevnar 20. Utd             Orders This Visit:  No orders of the defined types were placed in this encounter.      Meds This Visit:  Requested Prescriptions     Signed Prescriptions Disp Refills    azelastine 0.1 % Nasal Solution 3 each 0     Si sprays by Nasal route 2 (two) times daily.

## 2025-03-03 NOTE — PROGRESS NOTES
The following individual(s), Jem Andres, verbally consents to be recorded using ambient AI listening technology and understand that they can each withdraw their consent to this listening technology at any point by asking the clinician to turn off or pause the recording

## 2025-03-03 NOTE — PROGRESS NOTES
Jem Andres is a 64 year old male.    HPI:     Chief Complaint   Patient presents with    Consult     Discuss allergies - post nasal drip , nasal congestion     Patient is a 64-year-old male who presents for allergy consultation upon referral of his PCP, Dr. Sarah  cough with a chief complaint of asthma and allergies  Prior note from visit with PCP from January 10, 2025 reviewed and appreciated including history of asthma allergic rhinitis including symptoms of postnasal drip  Medications listed include Allegra D12 hour    Immunizations reviewed.  COVID-vaccine x 4 doses last in January 2022.  Prior flu vaccine in October 2024 up-to-date.  COVID booster in October 2023.  Pneumonia vaccine up-to-date from January 2025.     Today patient reports        History of Present Illness  Jem Andres is a 64 year old male who presents with a desire to reduce medication use and consider immunotherapy for allergies. He was referred by Dr. Mayorga for evaluation of his allergies.    He has a lifelong history of allergies, with symptoms present since at least joann high. He experiences year-round symptoms with seasonal exacerbations in the spring and fall. His primary symptoms include watery eyes, runny nose, and a raspy voice, particularly noticeable in the mornings. No current fevers or colored nasal drainage.    He has previously undergone allergy shots twice, with the last course being approximately ten years ago. He is interested in resuming immunotherapy. His current medications include Allegra D and Benadryl as needed. He has not used nasal sprays in the past but is open to considering them as an alternative to oral medications.    Regarding respiratory symptoms, he reports a failed spirometry test about ten years ago but denies any history of asthma, recurrent coughing, wheezing, chest tightness, or shortness of breath. He has never smoked and does not use an inhaler.    He has received a flu shot this fall and a  pneumonia vaccine recently. He also received a COVID vaccine a couple of years ago.         HISTORY:  Past Medical History:    Allergy    Anxiety state, unspecified    Colonic polyp    Inguinal hernia      Past Surgical History:   Procedure Laterality Date    Colonoscopy with biopsy  2012    Inguinal hernia repair Left 2010    Other      dental extractions      Family History   Problem Relation Age of Onset    Heart Disease Mother     Hypertension Mother     Alcohol and Other Disorders Associated Mother     Heart Attack Mother 32        MI    Cancer Father         Leukemia    Hypertension Father       Social History:   Social History     Socioeconomic History    Marital status:    Tobacco Use    Smoking status: Never     Passive exposure: Never    Smokeless tobacco: Never   Vaping Use    Vaping status: Never Used   Substance and Sexual Activity    Alcohol use: Yes     Comment: occasionally    Drug use: No   Other Topics Concern    Caffeine Concern Yes     Comment: coffee, 2 cup daily        Medications (Active prior to today's visit):  Current Outpatient Medications   Medication Sig Dispense Refill    diphenhydrAMINE 50 MG Oral Cap Take 1 capsule (50 mg total) by mouth every 6 (six) hours as needed for Itching.      azelastine 0.1 % Nasal Solution 2 sprays by Nasal route 2 (two) times daily. 3 each 0    sertraline 50 MG Oral Tab Take 1 tablet (50 mg total) by mouth nightly.      fexofenadine-pseudoephedrine ER (ALLEGRA-D ALLERGY & CONGESTION)  MG Oral Tablet 12 Hr Take 1 tablet by mouth 2 (two) times daily. (Patient not taking: Reported on 3/3/2025) 90 tablet 1       Allergies:  Allergies[1]      ROS:     Allergic/Immuno:  See HPI  Cardiovascular:  Negative for irregular heartbeat/palpitations, chest pain, edema  Constitutional:  Negative night sweats,weight loss, irritability and lethargy  Endocrine:  Negative for cold intolerance, polydipsia and polyphagia  ENMT:  Negative for ear drainage, hearing  loss and nasal drainage  Eyes:  Negative for eye discharge and vision loss  Gastrointestinal:  Negative for abdominal pain, diarrhea and vomiting  Genitourinary:  Negative for dysuria and hematuria  Hema/Lymph:  Negative for easy bleeding and easy bruising  Integumentary:  Negative for pruritus and rash  Musculoskeletal:  Negative for joint symptoms  Neurological:  Negative for dizziness, seizures  Psychiatric:  Negative for inappropriate interaction and psychiatric symptoms  Respiratory:  Negative for cough, dyspnea and wheezing      PHYSICAL EXAM:   Constitutional: responsive, no acute distress noted  Head/Face: NC/Atraumatic  Eyes/Vision: conjunctiva and lids are normal extraocular motion is intact   Ears/Audiometry: tympanic membranes are normal bilaterally hearing is grossly intact  Nose/Mouth/Throat: nose and throat are clear mucous membranes are moist   Neck/Thyroid: neck is supple without adenopathy  Lymphatic: no abnormal cervical, supraclavicular or axillary adenopathy is noted  Respiratory: normal to inspection lungs are clear to auscultation bilaterally normal respiratory effort   Cardiovascular: regular rate and rhythm no murmurs, gallups, or rubs  Abdomen: soft non-tender non-distended  Skin/Hair: no unusual rashes present  Extremities: no edema, cyanosis, or clubbing  Neurological:Oriented to time, place, person & situation       ASSESSMENT/PLAN:   Assessment   Encounter Diagnoses   Name Primary?    Seasonal and perennial allergic rhinoconjunctivitis Yes    Flu vaccine need     Need for COVID-19 vaccine     Bronchospasm      Skin testing today to common indoor and outdoor environmental allergens was   attempted however patient did not respond to the histamine control.  Patient reports he took Benadryl last night   Assessment & Plan  Allergic Rhinitis  Chronic allergic rhinitis with perennial symptoms, exacerbated in spring and fall. Symptoms include watery eyes, rhinorrhea, dysphonia, and postnasal  drip. Previously underwent two courses of immunotherapy, last over a decade ago. Currently on Allegra D and Benadryl PRN. Interested in resuming immunotherapy pending confirmation of allergies. Discussed Astelin as an alternative to Allegra D to minimize systemic side effects such as hypertension, insomnia, and tachycardia.  - Prescribe Astelin nasal spray, two sprays per nostril up to twice daily PRN.  - Provide educational materials on immunotherapy.  -Unable to skin test today as patient took Benadryl last night and did not respond to the histamine control.  Skin testing at next visit once patient is off antihistamines for least 5 days.      Bronchospasm  No asthma history. Previous spirometry over ten years ago reportedly low, but no documentation available. Denies recurrent cough, wheezing, chest tightness, or dyspnea. Declines spirometry currently.  - Monitor for asthma-like symptoms.  - Instruct to report any future asthma-like symptoms.    Vaccination Status  Influenza vaccine current for . COVID booster recommended as last booster was 2024. Pneumococcal vaccination with Prevnar 20 advised.  - Recommend COVID booster; verify availability at local pharmacy.  - Recommend pneumococcal vaccine with Prevnar 20. Utd             Orders This Visit:  No orders of the defined types were placed in this encounter.      Meds This Visit:  Requested Prescriptions     Signed Prescriptions Disp Refills    azelastine 0.1 % Nasal Solution 3 each 0     Si sprays by Nasal route 2 (two) times daily.       Imaging & Referrals:  None     3/3/2025  Pee Wilson MD      If medication samples were provided today, they were provided solely for patient education and training related to self administration of these medications.  Teaching, instruction and sample was provided to the patient by myself.  Teaching included  a review of potential adverse side effects as well as potential efficacy.  Patient's questions  were answered in regards to medication administration and dosing and potential side effects. Teaching was provided via the teach back method         [1]   Allergies  Allergen Reactions    Cephalexin      Other reaction(s): Hives    Penicillins      Other reaction(s): Hives    Tetracycline      Other reaction(s): Hives

## 2025-04-07 ENCOUNTER — TELEPHONE (OUTPATIENT)
Dept: ALLERGY | Facility: CLINIC | Age: 65
End: 2025-04-07

## 2025-04-07 NOTE — TELEPHONE ENCOUNTER
Patient is currently scheduled on April 15, 2025 at 11 AM in a 30-minute consult.  Patient was previously already seen by me on March 3, 2025.  Please reschedule patient to a 15-minute follow-up appointment.  Please reopen the 30-minute consult slot at 11am

## 2025-04-24 ENCOUNTER — NURSE TRIAGE (OUTPATIENT)
Dept: FAMILY MEDICINE CLINIC | Facility: CLINIC | Age: 65
End: 2025-04-24

## 2025-04-24 NOTE — TELEPHONE ENCOUNTER
Action Requested: Summary for Provider     []  Critical Lab, Recommendations Needed  [] Need Additional Advice  [x]   FYI    []   Need Orders  [] Need Medications Sent to Pharmacy  []  Other     SUMMARY:     Spoke with patient, Date of Birth verified  He stated last Sunday (Easter) he \"nicked\" his left hand middle finger while using his table saw.   It is not bleeding, and he kept the wound clean with hydrogen peroxide and the bandages fresh.  He denies s/s of infection, fever, redness, swelling.  He stated 4 days later he decided to make an appt to see available Provider as the area doesn't look pretty.   He is not diabetic,  and hsi tetanus shot is up to date.   Pt was advised if sx gets worse to go to ER/ IC, he agreed and stated understanding.  He was offered an appt today but declined.    Appt made with Ilda KUMAR for eval & treat.              Reason for call: cuts  Onset: 4 days              Reason for Disposition   Patient wants to be seen    Protocols used: Cuts and [id number]    Future Appointments   Date Time Provider Department Center   4/25/2025  2:10 PM [facility]   5/13/2025  2:30 PM Pee Wilson MD [facility]

## 2025-04-25 ENCOUNTER — LAB ENCOUNTER (OUTPATIENT)
Dept: LAB | Age: 65
End: 2025-04-25
Payer: COMMERCIAL

## 2025-04-25 ENCOUNTER — OFFICE VISIT (OUTPATIENT)
Dept: FAMILY MEDICINE CLINIC | Facility: CLINIC | Age: 65
End: 2025-04-25

## 2025-04-25 VITALS
HEIGHT: 67 IN | OXYGEN SATURATION: 97 % | SYSTOLIC BLOOD PRESSURE: 110 MMHG | WEIGHT: 156 LBS | HEART RATE: 85 BPM | DIASTOLIC BLOOD PRESSURE: 69 MMHG | BODY MASS INDEX: 24.48 KG/M2

## 2025-04-25 DIAGNOSIS — L03.012 CELLULITIS OF FINGER OF LEFT HAND: Primary | ICD-10-CM

## 2025-04-25 DIAGNOSIS — S61.213A LACERATION OF LEFT MIDDLE FINGER, FOREIGN BODY PRESENCE UNSPECIFIED, NAIL DAMAGE STATUS UNSPECIFIED, INITIAL ENCOUNTER: ICD-10-CM

## 2025-04-25 LAB
BASOPHILS # BLD AUTO: 0.04 X10(3) UL (ref 0–0.2)
BASOPHILS NFR BLD AUTO: 0.7 %
DEPRECATED RDW RBC AUTO: 42.2 FL (ref 35.1–46.3)
EOSINOPHIL # BLD AUTO: 0.12 X10(3) UL (ref 0–0.7)
EOSINOPHIL NFR BLD AUTO: 2.1 %
ERYTHROCYTE [DISTWIDTH] IN BLOOD BY AUTOMATED COUNT: 12.4 % (ref 11–15)
HCT VFR BLD AUTO: 40.4 % (ref 39–53)
HGB BLD-MCNC: 13.2 G/DL (ref 13–17.5)
IMM GRANULOCYTES # BLD AUTO: 0.01 X10(3) UL (ref 0–1)
IMM GRANULOCYTES NFR BLD: 0.2 %
LYMPHOCYTES # BLD AUTO: 1.17 X10(3) UL (ref 1–4)
LYMPHOCYTES NFR BLD AUTO: 20.6 %
MCH RBC QN AUTO: 30 PG (ref 26–34)
MCHC RBC AUTO-ENTMCNC: 32.7 G/DL (ref 31–37)
MCV RBC AUTO: 91.8 FL (ref 80–100)
MONOCYTES # BLD AUTO: 0.49 X10(3) UL (ref 0.1–1)
MONOCYTES NFR BLD AUTO: 8.6 %
NEUTROPHILS # BLD AUTO: 3.84 X10 (3) UL (ref 1.5–7.7)
NEUTROPHILS # BLD AUTO: 3.84 X10(3) UL (ref 1.5–7.7)
NEUTROPHILS NFR BLD AUTO: 67.8 %
PLATELET # BLD AUTO: 186 10(3)UL (ref 150–450)
RBC # BLD AUTO: 4.4 X10(6)UL (ref 4.3–5.7)
WBC # BLD AUTO: 5.7 X10(3) UL (ref 4–11)

## 2025-04-25 PROCEDURE — 36415 COLL VENOUS BLD VENIPUNCTURE: CPT

## 2025-04-25 PROCEDURE — 85025 COMPLETE CBC W/AUTO DIFF WBC: CPT

## 2025-04-25 RX ORDER — CIPROFLOXACIN 500 MG/1
500 TABLET, FILM COATED ORAL 2 TIMES DAILY
Qty: 14 TABLET | Refills: 0 | Status: SHIPPED | OUTPATIENT
Start: 2025-04-25 | End: 2025-05-02 | Stop reason: ALTCHOICE

## 2025-04-25 NOTE — PROGRESS NOTES
Subjective:   Jem Andres is a 64 year old male who presents for Injury (Pt states he injured the middle finger on his left hand by table saw on 4/20 and pt states he only has pain to the touch. ).     HPI   Patient presents with laceration on left middle finger.  Patient states that he was working on a table saw and got injured.  He has sharp pain with touch.     History/Other:      Chief Complaint Reviewed and Verified  Nursing Notes Reviewed and   Verified  Tobacco Reviewed  Allergies Reviewed  Medications Reviewed    Problem List Reviewed  Medical History Reviewed  Surgical History   Reviewed  Family History Reviewed  Social History Reviewed           Tobacco:  He has never smoked tobacco.      Current Medications[1]    Allergies[2]    Review of Systems   Constitutional: Negative.  Negative for activity change and fatigue.   HENT: Negative.  Negative for congestion, ear pain, rhinorrhea and sneezing.    Eyes: Negative.  Negative for redness.   Respiratory: Negative.  Negative for cough, shortness of breath and wheezing.    Cardiovascular: Negative.  Negative for chest pain.   Gastrointestinal: Negative.  Negative for abdominal pain, constipation, diarrhea, nausea and vomiting.   Endocrine: Negative.    Genitourinary: Negative.  Negative for difficulty urinating and frequency.   Musculoskeletal: Negative.  Negative for back pain, joint swelling and myalgias.   Skin:  Positive for wound. Negative for rash.   Allergic/Immunologic: Negative.    Neurological: Negative.  Negative for dizziness, syncope, light-headedness and headaches.   Hematological: Negative.    Psychiatric/Behavioral: Negative.           Objective:   /69 (BP Location: Right arm, Patient Position: Sitting, Cuff Size: adult)   Pulse 85   Ht 5' 7\" (1.702 m)   Wt 156 lb (70.8 kg)   SpO2 97%   BMI 24.43 kg/m²  Estimated body mass index is 24.43 kg/m² as calculated from the following:    Height as of this encounter: 5' 7\" (1.702  m).    Weight as of this encounter: 156 lb (70.8 kg).      Physical Exam  Vitals and nursing note reviewed.   Constitutional:       Appearance: Normal appearance. He is normal weight.   HENT:      Head: Normocephalic and atraumatic.      Right Ear: Tympanic membrane normal.      Left Ear: Tympanic membrane normal.      Nose: Nose normal.      Mouth/Throat:      Mouth: Mucous membranes are moist.      Pharynx: Oropharynx is clear.   Eyes:      Extraocular Movements: Extraocular movements intact.      Conjunctiva/sclera: Conjunctivae normal.      Pupils: Pupils are equal, round, and reactive to light.   Cardiovascular:      Rate and Rhythm: Normal rate and regular rhythm.      Pulses: Normal pulses.      Heart sounds: Normal heart sounds.   Pulmonary:      Effort: Pulmonary effort is normal.      Breath sounds: Normal breath sounds.   Abdominal:      General: Abdomen is flat. Bowel sounds are normal.      Palpations: Abdomen is soft.   Musculoskeletal:         General: Normal range of motion.      Cervical back: Normal range of motion and neck supple.   Skin:     General: Skin is warm and dry.      Findings: Erythema and laceration present.      Comments: Left 3rd digit    Neurological:      General: No focal deficit present.      Mental Status: He is alert and oriented to person, place, and time. Mental status is at baseline.   Psychiatric:         Mood and Affect: Mood normal.         Behavior: Behavior normal.         Thought Content: Thought content normal.         Judgment: Judgment normal.           Assessment & Plan:     Assessment & Plan  Cellulitis of finger of left hand  -Patient advised to take medication as directed  -Moist warm compresses for 10-15 mins 3-4 times a day  -Keep hands away from the area  -Educated about using ibuprofen/ tylenol for breakthrough pain  -Advised to keep clothing/hair/jewelry away from the area   Orders:    PLASTIC SURGERY - INTERNAL    ciprofloxacin (CIPRO) 500 MG Oral Tab;  Take 1 tablet (500 mg total) by mouth 2 (two) times daily.    CBC With Differential With Platelet    Laceration of left middle finger, foreign body presence unspecified, nail damage status unspecified, initial encounter    Orders:    PLASTIC SURGERY - INTERNAL    ciprofloxacin (CIPRO) 500 MG Oral Tab; Take 1 tablet (500 mg total) by mouth 2 (two) times daily.    CBC With Differential With Platelet       Medication use, effects and side effects discussed in detail with patient. The patient  indicated understanding of the diagnosis and agreed with the plan of care.    Return in about 2 weeks (around 5/9/2025).    STACY Saenz         [1]   Current Outpatient Medications   Medication Sig Dispense Refill    ciprofloxacin (CIPRO) 500 MG Oral Tab Take 1 tablet (500 mg total) by mouth 2 (two) times daily. 14 tablet 0    diphenhydrAMINE 50 MG Oral Cap Take 1 capsule (50 mg total) by mouth every 6 (six) hours as needed for Itching.      azelastine 0.1 % Nasal Solution 2 sprays by Nasal route 2 (two) times daily. 3 each 0    sertraline 50 MG Oral Tab Take 1 tablet (50 mg total) by mouth nightly.     [2]   Allergies  Allergen Reactions    Cephalexin      Other reaction(s): Hives    Penicillins      Other reaction(s): Hives    Tetracycline      Other reaction(s): Hives

## 2025-05-02 ENCOUNTER — OFFICE VISIT (OUTPATIENT)
Dept: INTERNAL MEDICINE CLINIC | Facility: CLINIC | Age: 65
End: 2025-05-02
Payer: COMMERCIAL

## 2025-05-02 VITALS
TEMPERATURE: 98 F | WEIGHT: 156.38 LBS | DIASTOLIC BLOOD PRESSURE: 72 MMHG | HEIGHT: 67 IN | SYSTOLIC BLOOD PRESSURE: 114 MMHG | OXYGEN SATURATION: 99 % | HEART RATE: 102 BPM | BODY MASS INDEX: 24.54 KG/M2

## 2025-05-02 DIAGNOSIS — Z51.89 ENCOUNTER FOR WOUND RE-CHECK: Primary | ICD-10-CM

## 2025-05-02 PROCEDURE — 3078F DIAST BP <80 MM HG: CPT | Performed by: NURSE PRACTITIONER

## 2025-05-02 PROCEDURE — 99213 OFFICE O/P EST LOW 20 MIN: CPT | Performed by: NURSE PRACTITIONER

## 2025-05-02 PROCEDURE — 3074F SYST BP LT 130 MM HG: CPT | Performed by: NURSE PRACTITIONER

## 2025-05-02 PROCEDURE — 3008F BODY MASS INDEX DOCD: CPT | Performed by: NURSE PRACTITIONER

## 2025-05-02 NOTE — PROGRESS NOTES
Subjective:   Jem Andres is a 64 year old male who presents for Follow - Up     Saw APRN in [facility] last week and was treated for cellulitis s/p middle finger injury on table saw.  He has completed 7 days of ciprofloxacin, and has been cleansing with peroxide and applying silvadene ointment to the area daily.   The finger is dressed with an occlusive dressing.  No drainage, swelling, pain, redness, warmth to the area.  No fevers/chills. He is feeling well and is able to move/use the finger as he normally would.    History/Other:    Chief Complaint Reviewed and Verified  No Further Nursing Notes to   Review  Tobacco Reviewed  Allergies Reviewed  Medications Reviewed    Medical History Reviewed  Surgical History Reviewed  Family History   Reviewed  Social History Reviewed         Tobacco:  He has never smoked tobacco.    Current Medications[1]      Review of Systems:  Review of Systems  10 point review of systems otherwise negative with the exception of HPI and assessment and plan.    Objective:   /72   Pulse 102   Temp 98.1 °F (36.7 °C) (Temporal)   Ht 5' 7\" (1.702 m)   Wt 156 lb 6.4 oz (70.9 kg)   SpO2 99%   BMI 24.50 kg/m²  Estimated body mass index is 24.5 kg/m² as calculated from the following:    Height as of this encounter: 5' 7\" (1.702 m).    Weight as of this encounter: 156 lb 6.4 oz (70.9 kg).      Physical Exam  Vitals reviewed.   Musculoskeletal:      Comments: Well healing avulsion injury to the tip of L middle finger with nailbed involvement. The wound is clean, moist, with scant serious drainage on the dressing when removed. No odor. No erythema, warmth, swelling, pain. Neurovascular exam intact.   Neurological:      Mental Status: He is alert.         Assessment & Plan:   1. Encounter for wound re-check (Primary)  - wound appears to be healing well without s/s infection/complications  - advised to stop applying peroxide to healing/open skin and instead cleanse with soap/water  daily. May continue to apply silvadene daily.   - advised to avoid occlusive dressing and applied bandage in office to allow for oxygen to reach the wound to aid in the healing process.  - return for any new pain, redness, warmth, swelling, purulent drainage - verbalized understanding.        Return if symptoms worsen or fail to improve.    STACY Huerta, 5/2/2025, 11:44 AM     This note was prepared using Dragon Medical voice recognition dictation software. As a result errors may occur. When identified, these errors have been corrected. While every attempt is made to correct errors during dictation discrepancies may still exist.       [1]   Current Outpatient Medications   Medication Sig Dispense Refill    ciprofloxacin (CIPRO) 500 MG Oral Tab Take 1 tablet (500 mg total) by mouth 2 (two) times daily. 14 tablet 0    azelastine 0.1 % Nasal Solution 2 sprays by Nasal route 2 (two) times daily. 3 each 0    sertraline 50 MG Oral Tab Take 1 tablet (50 mg total) by mouth nightly.      diphenhydrAMINE 50 MG Oral Cap Take 1 capsule (50 mg total) by mouth every 6 (six) hours as needed for Itching. (Patient not taking: Reported on 5/2/2025)

## 2025-05-05 ENCOUNTER — OFFICE VISIT (OUTPATIENT)
Dept: SURGERY | Facility: CLINIC | Age: 65
End: 2025-05-05
Payer: COMMERCIAL

## 2025-05-05 DIAGNOSIS — S61.203A OPEN WOUND OF LEFT MIDDLE FINGER WITHOUT DAMAGE TO NAIL, INITIAL ENCOUNTER: Primary | ICD-10-CM

## 2025-05-05 PROCEDURE — 99204 OFFICE O/P NEW MOD 45 MIN: CPT | Performed by: PLASTIC SURGERY

## 2025-05-05 RX ORDER — SILVER SULFADIAZINE 10 MG/G
CREAM TOPICAL DAILY
COMMUNITY

## 2025-05-05 NOTE — H&P
Jem Andres is a 64 year old male that presents with   Chief Complaint   Patient presents with    Injury     Laceration LMF   .    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    RECONSTRUCTIVE HISTORY    SUN EXPOSURE   Current no   Past no   Sunburns no   Tanning salons current no   Tanning salons past no     SKIN CANCER    Personal history of skin cancer: none      HPI:       Injury 1: LACERATION LMF  - Date: 04/20/25  - Days Since: 15      64-year-old male right-hand-dominant with an LMF laceration    Tablesaw injury    Immediate care 4/25    Twice daily Silvadene    Sensitive if he \"bumps it\"       Review of Systems:   Constitutional: No change in appetite, chill/rigors, or fatigue  GI: No jaundice  Endocrine: No generalized weakness  Neurological: No aphasia, loss of consciousness, or seizures      Integumentary:     LACERATION     Location Distal LMF    Mechanism tablesaw    Treatment Seen APRN 4/25 completed ciprofloxin     Local care Washing with water and applying silvadene every 2-3 day covering with band aid   Stopped hydrogen peroxide 5/2        PMH:     MEDICAL  Past Medical History[1]     SURGICAL  Past Surgical History[2]     ALLERIGIES  Allergies[3]     MEDICATIONS  Current Medications[4]     SOCIAL HISTORY  Short Social Hx on File[5]     FAMILY HISTORY  Family History[6]       PHYSICAL EXAM:     CONSTITUTIONAL: Overall appearance - Normal  HEENT: Normocephalic  EYES: Conjunctiva - Right: Normal, Left: Normal; EOMI  EARS: Inspection - Right: Normal, Left: Normal  NECK/THYROID: Inspection - Normal, Palpation - Normal, Thyroid gland - Normal, No adenopathy  RESPIRATORY: Inspection - Normal, Effort - Normal  CARDIOVASCULAR: Regular rhythm, No murmurs  ABDOMEN: Inspection - Normal, No abdominal tenderness  NEURO: Memory  intact  PSYCH: Oriented to person, place, time, and situation, Appropriate mood and affect      Physical Exam:       LMF distal wound: 10 x 5, epithelializing, not infected, not involving the nail      ASSESSMENT/PLAN:         WOUND(S) LMF fingertip      This should epithelialize within 1 to 2 weeks.    Wash daily, Silvadene, Band-Aid    When healed:  Start Eucerin moisturizers and massage  \"After Skin Surgery\" pamphlet dispensed.          If, in 4 weeks, function and strength are not normal, call for appointment.                5/5/2025  AI Seymour MD      +++++++++++++++++++++++++++++++++++++++++      MEDICAL DECISION MAKING    PROBLEMS      MODERATE    (number / complexity)          Acute complicated injury    DATA         STRAIGHTFORWARD    (amount / complexity)         MANAGEMENT RISK  MODERATE    (complications/ morbidity)       Silvadene                  MDM LEVEL    MODERATE                [1]   Past Medical History:   Allergy    Anxiety state, unspecified    Colonic polyp    Inguinal hernia   [2]   Past Surgical History:  Procedure Laterality Date    Colonoscopy with biopsy  2012    Inguinal hernia repair Left 2010    Other      dental extractions   [3]   Allergies  Allergen Reactions    Cephalexin      Other reaction(s): Hives    Penicillins      Other reaction(s): Hives    Tetracycline      Other reaction(s): Hives   [4]   Current Outpatient Medications   Medication Sig Dispense Refill    silver sulfADIAZINE 1 % External Cream Apply topically daily.      azelastine 0.1 % Nasal Solution 2 sprays by Nasal route 2 (two) times daily. 3 each 0    sertraline 50 MG Oral Tab Take 1 tablet (50 mg total) by mouth nightly.     [5]   Social History  Socioeconomic History    Marital status:    Tobacco Use    Smoking status: Never     Passive exposure: Never    Smokeless tobacco: Never   Vaping Use    Vaping status: Never Used   Substance and Sexual Activity    Alcohol use: Yes      Comment: occasionally    Drug use: No   Other Topics Concern    Caffeine Concern Yes     Comment: coffee, 2 cup daily    Right Handed Yes   [6]   Family History  Problem Relation Age of Onset    Heart Disease Mother     Hypertension Mother     Alcohol and Other Disorders Associated Mother     Heart Attack Mother 32        MI    Cancer Father         Leukemia    Hypertension Father

## (undated) NOTE — LETTER
25      Patient: Jem Andres  : 1960 Visit date: 2025    Dear  Ilda,      I examined your patient in consultation today.    He has a fingertip avulsion of the left middle finger.  It is not infected, and is healing.    I would continue him on daily washings and Silvadene applications.  This should epithelialize within 1 to 2 weeks.    Thank you for your kind referral. If I may answer any questions, please feel free to contact me.     Sincerely,   Saurav Miranda MD     CC: No Recipients